# Patient Record
Sex: MALE | Race: WHITE
[De-identification: names, ages, dates, MRNs, and addresses within clinical notes are randomized per-mention and may not be internally consistent; named-entity substitution may affect disease eponyms.]

---

## 2023-05-18 ENCOUNTER — HOSPITAL ENCOUNTER (OUTPATIENT)
Dept: HOSPITAL 47 - RADMRIMAIN | Age: 68
Discharge: HOME | End: 2023-05-18
Attending: UROLOGY
Payer: COMMERCIAL

## 2023-05-18 DIAGNOSIS — N40.0: Primary | ICD-10-CM

## 2023-05-18 DIAGNOSIS — R97.20: ICD-10-CM

## 2023-05-18 PROCEDURE — 72197 MRI PELVIS W/O & W/DYE: CPT

## 2023-05-18 NOTE — MR
EXAMINATION TYPE: MR Prostate wo/w con

 

DATE OF EXAM: 5/18/2023

 

COMPARISON: None.

 

INDICATION: No prior, elevated PSA, multiple neg biopsies 

PSA:  12.9 ng/ml uncertain date. Was 11.8 on October 28, 2022

Recent Biopsy and Date: March 11, 2022

Pathology Report (If applicable): Negative

 

TECHNIQUE:  

Examination was performed using a 3T MRI without an endorectal coil. Multiparametric imaging was perf
ormed with T2 mutliplanar sequences, axial diffusion weighted imaging and dynamic contrast enhanced i
maging,  utilizing 10ml mL intravenous Gadavist gadolinium contrast.  

 

FINDINGS:

PROSTATE VOLUME: 5.3  cm SI x 3.9  cm AP x 5.1  cm LR  Vol= 55.2 cc

PSA DENSITY: 0.23  ng/ml/cc

 

Enlarged prostate consistent with BPH is present. Areas of slight indistinct diminished signal on ADC
 mapping in the right peripheral zone greatest at mid apical level without moderate diminished signal
 or increased signal on diffusion-weighted imaging.

 

Central zone shows heterogeneity with roughly 5 mm round moderate to severe T2 hypointense nodule or 
area axial image 19 corresponding to coronal image 14. Mid zone right prostate gland. No increased si
gnal or restricted diffusion on DWI imaging. PI-RADS 4 lesion.

 

IMPRESSION: Enlarged prostate. Suspicious 5 mm T2 hypointense nodule is concerning.

\

Highest Assessment Category: 4

MRI Stage: T0  N0  M0 based on review of pelvic images.

False negative rates for MRI range from 5-20% depending on risk profile.

 

Consider targeted sampling at this level.

 

 

Assessment Categories:

1 ? Very low (clinically significant cancer is highly unlikely to be present)

2 ? Low (clinically significant cancer is unlikely to be present)

3 ? Intermediate (the presence of clinically significant cancer is equivocal)

4 ? High (clinically significant cancer is likely to be present)

5 ? Very high (clinically significant cancer is highly likely to be present)

## 2023-07-26 NOTE — P.GSHP
History of Present Illness


H&P Date: 23


Chief Complaint: Elevated PSA


The patient is a 68-year-old white male with a family history of prostate 

cancer.  His PSA level has been persistently elevated, and he has undergone 

prostate biopsies in , , and , showing no evidence of malignancy.  

Recent MRI of the prostate shows a PI-RADS 4 lesion in the right peripheral 

zone.








- Cardiovascular


Cardiovascular: Reports high blood pressure





- Genitourinary (Male)


Genitourinary: Denies urinary frequency





Past Medical History


Past Medical History: Asthma, Diabetes Mellitus, Hyperlipidemia, Hypertension, 

Osteoarthritis (OA)


Additional Past Medical History / Comment(s): "Borderline diabetic",  bilateral 

knee arthritis/pain


History of Any Multi-Drug Resistant Organisms: None Reported


Past Surgical History: Appendectomy, Tonsillectomy


Additional Past Surgical History / Comment(s): Nasal fracture with surgery.


Past Anesthesia/Blood Transfusion Reactions: No Reported Reaction


Additional Past Anesthesia/Blood Transfusion Reaction / Comment(s): Pt has never

received blood.


Smoking Status: Never smoker





- Past Family History


  ** Father


Family Medical History: Myocardial Infarction (MI)


Additional Family Medical History / Comment(s):  of a MI at 56yrs.





  ** Mother


Family Medical History: Cancer





Medications and Allergies


                                Home Medications











 Medication  Instructions  Recorded  Confirmed  Type


 


Aspirin [Adult Low Dose Aspirin EC] 162 mg PO HS 23 History


 


Atorvastatin [Lipitor] 20 mg PO HS 23 History


 


Glucosamine HCl/Chondroitin Luther 1 each PO HS 23 History





[Glucosamine-Chondroitin Cap]    


 


Ibuprofen [Motrin Ib] 400 mg PO Q8H PRN 23 History


 


Lisinopril-Hctz 20-12.5 mg 1 tab PO HS 23 History





[Zestoretic 20-12.5]    


 


Montelukast [Singulair] 10 mg PO HS 23 History


 


Verapamil HCl [Verapamil ER] 240 mg PO HS 23 History


 


atenoloL 25 mg PO HS 23 History


 


metFORMIN HCL [Glucophage] 500 mg PO HS 23 History








                                    Allergies











Allergy/AdvReac Type Severity Reaction Status Date / Time


 


cat dander Allergy  Nausea Verified 23 08:42














Surgical - Exam





- General


well developed, well nourished, no distress





- Respiratory


normal respiratory effort





- Abdomen


Abdomen: soft, non tender, no guarding, no rigid, no rebound





- Genitourinary


normal penis with no external lesions, testicles non-tender





- Rectum


Rectum: normal sphincter tone, no masses, other (Prostate moderately enlarged, 

with right-sided asymmetrical enlargement)





- Psychiatric


oriented to time, oriented to person, oriented to place, speech is normal, 

memory intact





Assessment and Plan


(1) Elevated prostate specific antigen [PSA]


Status: Acute   Code(s): R97.20 - ELEVATED PROSTATE SPECIFIC ANTIGEN [PSA]   

SNOMED Code(s): 324415994


   


Plan: 


MRI ultrasound fusion biopsies of the prostate.  The procedure has been reviewed

 in detail with the patient.  He has been made aware of potential risks, which 

include anesthesia, bleeding, and infection.

## 2023-07-27 ENCOUNTER — HOSPITAL ENCOUNTER (OUTPATIENT)
Dept: HOSPITAL 47 - OR | Age: 68
Discharge: HOME | End: 2023-07-27
Attending: UROLOGY
Payer: COMMERCIAL

## 2023-07-27 VITALS — DIASTOLIC BLOOD PRESSURE: 73 MMHG | SYSTOLIC BLOOD PRESSURE: 154 MMHG | HEART RATE: 65 BPM

## 2023-07-27 VITALS — TEMPERATURE: 97 F

## 2023-07-27 VITALS — RESPIRATION RATE: 18 BRPM

## 2023-07-27 DIAGNOSIS — R97.20: Primary | ICD-10-CM

## 2023-07-27 DIAGNOSIS — Z79.82: ICD-10-CM

## 2023-07-27 DIAGNOSIS — Z82.49: ICD-10-CM

## 2023-07-27 DIAGNOSIS — Z80.42: ICD-10-CM

## 2023-07-27 DIAGNOSIS — J45.909: ICD-10-CM

## 2023-07-27 DIAGNOSIS — Z90.89: ICD-10-CM

## 2023-07-27 DIAGNOSIS — I10: ICD-10-CM

## 2023-07-27 DIAGNOSIS — Z90.49: ICD-10-CM

## 2023-07-27 DIAGNOSIS — E11.9: ICD-10-CM

## 2023-07-27 DIAGNOSIS — Z79.899: ICD-10-CM

## 2023-07-27 DIAGNOSIS — E78.5: ICD-10-CM

## 2023-07-27 LAB — GLUCOSE BLD-MCNC: 101 MG/DL (ref 70–110)

## 2023-07-27 PROCEDURE — 55700: CPT

## 2023-07-27 PROCEDURE — 88305 TISSUE EXAM BY PATHOLOGIST: CPT

## 2023-07-27 PROCEDURE — 88344 IMHCHEM/IMCYTCHM EA MLT ANTB: CPT

## 2023-07-27 NOTE — P.OP
Date of Procedure: 07/27/23


Preoperative Diagnosis: 


Elevated PSA


Postoperative Diagnosis: 


Same


Procedure(s) Performed: 


MRI ultrasound fusion biopsies of the prostate


Anesthesia: MAC


Surgeon: Kike Johnson


Estimated Blood Loss (ml): 5


IV fluids (ml): 200


Condition: stable


Disposition: PACU


Indications for Procedure: 


The patient is a 68-year-old white male with a family history of prostate 

cancer.  His PSA level has been persistently elevated, and he has undergone 

prostate biopsies in 2012, 2015, and 2022, showing no evidence of malignancy.  

Recent MRI of the prostate shows a PI-RADS 4 lesion in the right peripheral 

zone.  He now comes for MRI fusion biopsies.





Operative Findings: 


54 mL prostate.  Adequate biopsies obtained.


Description of Procedure: 


The patient was taken to the operating room and placed in the left lateral 

decubitus position.  The [employer] transrectal ultrasound probe was placed 

intrarectally.  It was then placed within the stand of the [employer] MRI/TRUS 

Fusion for Prostate Biopsy system.  The prostate was imaged in both the axial 

and sagittal planes, revealing a prostate volume of 54 mL.  Using the Biop[employer] 

gun, 3 biopsies were obtained from the target lesion.  The remaining 12 biopsies

of the peripheral zone were obtained utilizing a standard template.  Once the 

procedure was completed, the ultrasound probe was removed.  The patient 

tolerated the procedure well was taken to the recovery room stable condition.